# Patient Record
Sex: FEMALE | Race: BLACK OR AFRICAN AMERICAN | Employment: FULL TIME
[De-identification: names, ages, dates, MRNs, and addresses within clinical notes are randomized per-mention and may not be internally consistent; named-entity substitution may affect disease eponyms.]

---

## 2023-01-13 ENCOUNTER — OFFICE VISIT (OUTPATIENT)
Dept: INTERNAL MEDICINE CLINIC | Facility: CLINIC | Age: 53
End: 2023-01-13
Payer: COMMERCIAL

## 2023-01-13 VITALS
SYSTOLIC BLOOD PRESSURE: 180 MMHG | DIASTOLIC BLOOD PRESSURE: 102 MMHG | RESPIRATION RATE: 16 BRPM | OXYGEN SATURATION: 99 % | HEIGHT: 67 IN | WEIGHT: 220 LBS | TEMPERATURE: 97.3 F | HEART RATE: 82 BPM | BODY MASS INDEX: 34.53 KG/M2

## 2023-01-13 DIAGNOSIS — G44.52 NEW DAILY PERSISTENT HEADACHE: ICD-10-CM

## 2023-01-13 DIAGNOSIS — Z12.31 ENCOUNTER FOR SCREENING MAMMOGRAM FOR BREAST CANCER: ICD-10-CM

## 2023-01-13 DIAGNOSIS — I51.7 LVH (LEFT VENTRICULAR HYPERTROPHY): ICD-10-CM

## 2023-01-13 DIAGNOSIS — I10 PRIMARY HYPERTENSION: Primary | ICD-10-CM

## 2023-01-13 DIAGNOSIS — N95.1 HOT FLASHES DUE TO MENOPAUSE: ICD-10-CM

## 2023-01-13 LAB
ALBUMIN SERPL-MCNC: 4 G/DL (ref 3.5–5)
ALBUMIN/GLOB SERPL: 1.2 (ref 0.4–1.6)
ALP SERPL-CCNC: 69 U/L (ref 50–136)
ALT SERPL-CCNC: 20 U/L (ref 12–65)
ANION GAP SERPL CALC-SCNC: 6 MMOL/L (ref 2–11)
APPEARANCE UR: ABNORMAL
AST SERPL-CCNC: 15 U/L (ref 15–37)
BACTERIA URNS QL MICRO: ABNORMAL /HPF
BASOPHILS # BLD: 0 K/UL (ref 0–0.2)
BASOPHILS NFR BLD: 1 % (ref 0–2)
BILIRUB SERPL-MCNC: 0.3 MG/DL (ref 0.2–1.1)
BILIRUB UR QL: NEGATIVE
BUN SERPL-MCNC: 15 MG/DL (ref 6–23)
CALCIUM SERPL-MCNC: 10.6 MG/DL (ref 8.3–10.4)
CASTS URNS QL MICRO: 0 /LPF
CHLORIDE SERPL-SCNC: 107 MMOL/L (ref 101–110)
CO2 SERPL-SCNC: 28 MMOL/L (ref 21–32)
COLOR UR: ABNORMAL
CREAT SERPL-MCNC: 1.2 MG/DL (ref 0.6–1)
CRYSTALS URNS QL MICRO: 0 /LPF
DIFFERENTIAL METHOD BLD: ABNORMAL
EOSINOPHIL # BLD: 0.1 K/UL (ref 0–0.8)
EOSINOPHIL NFR BLD: 2 % (ref 0.5–7.8)
EPI CELLS #/AREA URNS HPF: ABNORMAL /HPF
ERYTHROCYTE [DISTWIDTH] IN BLOOD BY AUTOMATED COUNT: 14.4 % (ref 11.9–14.6)
ESTRADIOL SERPL-MCNC: <11.8 PG/ML
FSH SERPL-ACNC: 136.9 MIU/ML
GLOBULIN SER CALC-MCNC: 3.4 G/DL (ref 2.8–4.5)
GLUCOSE SERPL-MCNC: 102 MG/DL (ref 65–100)
GLUCOSE UR STRIP.AUTO-MCNC: NEGATIVE MG/DL
HCT VFR BLD AUTO: 36.6 % (ref 35.8–46.3)
HGB BLD-MCNC: 11.5 G/DL (ref 11.7–15.4)
HGB UR QL STRIP: NEGATIVE
IMM GRANULOCYTES # BLD AUTO: 0 K/UL (ref 0–0.5)
IMM GRANULOCYTES NFR BLD AUTO: 1 % (ref 0–5)
KETONES UR QL STRIP.AUTO: NEGATIVE MG/DL
LEUKOCYTE ESTERASE UR QL STRIP.AUTO: ABNORMAL
LYMPHOCYTES # BLD: 1.3 K/UL (ref 0.5–4.6)
LYMPHOCYTES NFR BLD: 22 % (ref 13–44)
MAGNESIUM SERPL-MCNC: 2.1 MG/DL (ref 1.8–2.4)
MCH RBC QN AUTO: 28.4 PG (ref 26.1–32.9)
MCHC RBC AUTO-ENTMCNC: 31.4 G/DL (ref 31.4–35)
MCV RBC AUTO: 90.4 FL (ref 82–102)
MONOCYTES # BLD: 0.4 K/UL (ref 0.1–1.3)
MONOCYTES NFR BLD: 7 % (ref 4–12)
MUCOUS THREADS URNS QL MICRO: ABNORMAL /LPF
NEUTS SEG # BLD: 3.9 K/UL (ref 1.7–8.2)
NEUTS SEG NFR BLD: 67 % (ref 43–78)
NITRITE UR QL STRIP.AUTO: NEGATIVE
NRBC # BLD: 0 K/UL (ref 0–0.2)
OTHER OBSERVATIONS: ABNORMAL
PH UR STRIP: 5.5 (ref 5–9)
PLATELET # BLD AUTO: 286 K/UL (ref 150–450)
PMV BLD AUTO: 10.7 FL (ref 9.4–12.3)
POTASSIUM SERPL-SCNC: 4.1 MMOL/L (ref 3.5–5.1)
PROT SERPL-MCNC: 7.4 G/DL (ref 6.3–8.2)
PROT UR STRIP-MCNC: ABNORMAL MG/DL
RBC # BLD AUTO: 4.05 M/UL (ref 4.05–5.2)
RBC #/AREA URNS HPF: ABNORMAL /HPF
SODIUM SERPL-SCNC: 141 MMOL/L (ref 133–143)
SP GR UR REFRACTOMETRY: 1.02 (ref 1–1.02)
TSH W FREE THYROID IF ABNORMAL: 0.5 UIU/ML (ref 0.36–3.74)
UROBILINOGEN UR QL STRIP.AUTO: 0.2 EU/DL (ref 0.2–1)
WBC # BLD AUTO: 5.8 K/UL (ref 4.3–11.1)
WBC URNS QL MICRO: ABNORMAL /HPF

## 2023-01-13 PROCEDURE — 93000 ELECTROCARDIOGRAM COMPLETE: CPT | Performed by: INTERNAL MEDICINE

## 2023-01-13 PROCEDURE — 3078F DIAST BP <80 MM HG: CPT | Performed by: INTERNAL MEDICINE

## 2023-01-13 PROCEDURE — 3074F SYST BP LT 130 MM HG: CPT | Performed by: INTERNAL MEDICINE

## 2023-01-13 PROCEDURE — 99214 OFFICE O/P EST MOD 30 MIN: CPT | Performed by: INTERNAL MEDICINE

## 2023-01-13 RX ORDER — METOPROLOL TARTRATE 50 MG/1
50 TABLET, FILM COATED ORAL 2 TIMES DAILY
Qty: 60 TABLET | Refills: 3 | Status: SHIPPED | OUTPATIENT
Start: 2023-01-13 | End: 2023-01-20 | Stop reason: DRUGHIGH

## 2023-01-13 RX ORDER — OLMESARTAN MEDOXOMIL AND HYDROCHLOROTHIAZIDE 40/25 40; 25 MG/1; MG/1
1 TABLET ORAL DAILY
COMMUNITY
Start: 2021-12-17 | End: 2023-01-13 | Stop reason: SINTOL

## 2023-01-13 ASSESSMENT — PATIENT HEALTH QUESTIONNAIRE - PHQ9
SUM OF ALL RESPONSES TO PHQ QUESTIONS 1-9: 0
SUM OF ALL RESPONSES TO PHQ QUESTIONS 1-9: 0
1. LITTLE INTEREST OR PLEASURE IN DOING THINGS: 0
SUM OF ALL RESPONSES TO PHQ9 QUESTIONS 1 & 2: 0
2. FEELING DOWN, DEPRESSED OR HOPELESS: 0
SUM OF ALL RESPONSES TO PHQ QUESTIONS 1-9: 0
SUM OF ALL RESPONSES TO PHQ QUESTIONS 1-9: 0

## 2023-01-13 NOTE — PROGRESS NOTES
vis01/13/2023   Location:[facility] [address] [facility]  [place]  Patient #:  [id number]  YOB: 1970        History of Present Illness     Chief Complaint   Patient presents with    New Patient     Establishing care    Hypertension    Menopause       Ms. Mica Castano is a 47 y.o. female  who presents for visit to establish care. Chronic active medical issues assessed today include  HTN, obesity HLD  Amlodipine nausea took 2-3 weeks. HCTZ - nausea  nausea. Benicar hctz 40-25. Untreated HTN. Reports episode of HA, lethargy and confusion. She sleep all that day. The next felt better. Last Labs      No Known Allergies  Past Medical History:   Diagnosis Date    Hypertension      Social History     Socioeconomic History    Marital status:    Tobacco Use    Smoking status: Never    Smokeless tobacco: Never   Vaping Use    Vaping Use: Never used   Substance and Sexual Activity    Drug use: Never     Past Surgical History:   Procedure Laterality Date    BREAST BIOPSY Right     HYSTERECTOMY (CERVIX STATUS UNKNOWN)      MOUTH SURGERY      WISDOM TOOTH EXTRACTION       Family History   Problem Relation Age of Onset    Heart Attack Father      Current Outpatient Medications   Medication Sig Dispense Refill    olmesartan-hydroCHLOROthiazide (BENICAR HCT) 40-25 MG per tablet Take 1 tablet by mouth daily (Patient not taking: Reported on 1/13/2023)       No current facility-administered medications for this visit.      Health Maintenance   Topic Date Due    COVID-19 Vaccine (1) Never done    Depression Screen  Never done    HIV screen  Never done    Hepatitis C screen  Never done    DTaP/Tdap/Td vaccine (3 - Tdap) 08/08/1988    Diabetes screen  Never done    Lipids  Never done    Colorectal Cancer Screen  Never done    Breast cancer screen  Never done    Shingles vaccine (1 of 2) Never done    Flu vaccine (1) Never done    Hepatitis A vaccine  Aged Out    Hib vaccine  Aged Out Meningococcal (ACWY) vaccine  Aged Out    Pneumococcal 0-64 years Vaccine  Aged Out             Review of Systems  Review of Systems   Constitutional:  Positive for diaphoresis (hot flashes) and fatigue. Negative for fever. Eyes:  Negative for visual disturbance. Respiratory:  Negative for cough and shortness of breath. Cardiovascular:  Positive for palpitations and leg swelling. Gastrointestinal:  Negative for abdominal pain and nausea. Genitourinary:  Negative for difficulty urinating. Musculoskeletal:  Positive for arthralgias and myalgias. Neurological:  Positive for headaches. Psychiatric/Behavioral:  Positive for dysphoric mood and sleep disturbance. BP (!) 187/110 (Site: Left Upper Arm, Position: Sitting)   Pulse 82   Temp 97.3 °F (36.3 °C) (Temporal)   Resp 16   Ht 5' 7\" (1.702 m)   Wt 220 lb (99.8 kg)   SpO2 99%   BMI 34.46 kg/m²     Wt Readings from Last 3 Encounters:   01/13/23 220 lb (99.8 kg)       Physical Exam    Physical Exam  Vitals and nursing note reviewed. Constitutional:       Appearance: Normal appearance. HENT:      Head: Normocephalic. Right Ear: Tympanic membrane normal.      Left Ear: Tympanic membrane normal.      Nose: Nose normal.      Mouth/Throat:      Mouth: Mucous membranes are moist.   Eyes:      Extraocular Movements: Extraocular movements intact. Conjunctiva/sclera: Conjunctivae normal.      Pupils: Pupils are equal, round, and reactive to light. Neck:      Vascular: No carotid bruit. Cardiovascular:      Rate and Rhythm: Normal rate and regular rhythm. Pulmonary:      Effort: Pulmonary effort is normal.      Breath sounds: Normal breath sounds. Chest:   Breasts:     Right: Normal. No mass. Left: Normal. No mass. Abdominal:      General: Bowel sounds are normal.      Palpations: Abdomen is soft. Tenderness: There is no abdominal tenderness. Lymphadenopathy:      Cervical: No cervical adenopathy.       Upper Body: Right upper body: No supraclavicular or axillary adenopathy. Left upper body: No supraclavicular or axillary adenopathy. Skin:     General: Skin is warm and dry. Neurological:      Mental Status: She is alert. Cranial Nerves: Cranial nerves 2-12 are intact. Motor: Motor function is intact. Gait: Gait is intact. Psychiatric:         Attention and Perception: Attention normal.         Mood and Affect: Mood normal.         Speech: Speech normal.         Cognition and Memory: Cognition normal.     EKG   Sinus  Rhythm @74bpm  -RSR(V1) -nondiagnostic. Voltage criteria for LVH  (R(I)+S(III) exceeds 2.50 mV)    No ischemic ST TW abn    Assessment & Plan    Encounter Diagnoses   Name Primary?     Primary hypertension Yes    Hot flashes due to menopause     New daily persistent headache     LVH (left ventricular hypertrophy)     Encounter for screening mammogram for breast cancer        Orders Placed This Encounter   Medications    metoprolol tartrate (LOPRESSOR) 50 MG tablet     Sig: Take 1 tablet by mouth 2 times daily     Dispense:  60 tablet     Refill:  3       Orders Placed This Encounter   Procedures    JOSE JAYNE DIGITAL SCREEN BILATERAL     Standing Status:   Future     Standing Expiration Date:   3/13/2024     Scheduling Instructions:      BE[facility]    CT HEAD WO CONTRAST     Standing Status:   Future     Standing Expiration Date:   1/13/2024     Scheduling Instructions:      [facility]     Order Specific Question:   Reason for exam:     Answer:   hypertension poorly controlled, headaches and an episode of confusion    CBC with Auto Differential     Standing Status:   Future     Number of Occurrences:   1     Standing Expiration Date:   1/13/2024    Comprehensive Metabolic Panel     Standing Status:   Future     Number of Occurrences:   1     Standing Expiration Date:   1/13/2024    Magnesium     Standing Status:   Future     Number of Occurrences:   1     Standing Expiration Date:   1/13/2024    TSH with Reflex     Standing Status:   Future     Number of Occurrences:   1     Standing Expiration Date:   1/13/2024    Urinalysis     Standing Status:   Future     Number of Occurrences:   1     Standing Expiration Date:   9/08/3755    Follicle Stimulating Hormone     Standing Status:   Future     Number of Occurrences:   1     Standing Expiration Date:   1/13/2024    Estradiol     Standing Status:   Future     Number of Occurrences:   1     Standing Expiration Date:   1/13/2024    Urinalysis, Micro     ATLASORDERNUMBER:[id number]:UMIC*URINALYSIS, MICRO    [address] [facility] [place]     Referral Priority:   Routine     Referral Type:   Eval and Treat     Referral Reason:   Specialty Services Required     Requested Specialty:   Cardiology     Number of Visits Requested:   1    EKG 12 Lead     Standing Status:   Future     Number of Occurrences:   1     Standing Expiration Date:   1/13/2024     Order Specific Question:   Reason for Exam?     Answer:   Hypertension     HTN review of previous notes reveal this has been her BP for sometime mostly due to compliance and med intolerance issues. Start Metoprolol. LVH from poorly controlled HTN. Check echo. Discussed complications of HTN and encouraged compliance with treatment. If she has issues she should contact the office. She will monitor her BP at home and return in a week. Check head CT check for past stroke. The patient is asked to make an attempt to improve diet and exercise patterns to aid in medical management of this problem. Will address other issues during next couple of follow up appt. Discussed the importance of regular breast exams and mammograms. BSE reviewed with patient. No follow-ups on file.         Virgen Sandoval MD

## 2023-01-16 ENCOUNTER — TELEPHONE (OUTPATIENT)
Dept: INTERNAL MEDICINE CLINIC | Facility: CLINIC | Age: 53
End: 2023-01-16

## 2023-01-16 NOTE — TELEPHONE ENCOUNTER
----- Message from Papito Paredes MD sent at 1/13/2023  6:49 PM EST -----  Reviewed recent labs    Recent labs were reviewed. Glucose/Blood sugar was 102. This okay since you were not completely fasting. Your liver function and thyroid function  were normal.  Your chronic kidney disease is stable to a little improved compared on 2021 labs  You are definitely menopausal.  Calcium was a little elevated so stop any calcium supplements you may be taking. Are you having any urinary symptoms? You had a few wbc and rbc in your urine.

## 2023-01-20 ENCOUNTER — OFFICE VISIT (OUTPATIENT)
Dept: INTERNAL MEDICINE CLINIC | Facility: CLINIC | Age: 53
End: 2023-01-20
Payer: COMMERCIAL

## 2023-01-20 VITALS
HEART RATE: 63 BPM | SYSTOLIC BLOOD PRESSURE: 168 MMHG | WEIGHT: 217.4 LBS | TEMPERATURE: 97.4 F | RESPIRATION RATE: 17 BRPM | OXYGEN SATURATION: 98 % | HEIGHT: 67 IN | BODY MASS INDEX: 34.12 KG/M2 | DIASTOLIC BLOOD PRESSURE: 99 MMHG

## 2023-01-20 DIAGNOSIS — N95.1 HOT FLASHES DUE TO MENOPAUSE: ICD-10-CM

## 2023-01-20 DIAGNOSIS — I51.7 LVH (LEFT VENTRICULAR HYPERTROPHY): ICD-10-CM

## 2023-01-20 DIAGNOSIS — I10 PRIMARY HYPERTENSION: Primary | ICD-10-CM

## 2023-01-20 PROCEDURE — 3074F SYST BP LT 130 MM HG: CPT | Performed by: INTERNAL MEDICINE

## 2023-01-20 PROCEDURE — 99214 OFFICE O/P EST MOD 30 MIN: CPT | Performed by: INTERNAL MEDICINE

## 2023-01-20 PROCEDURE — 3078F DIAST BP <80 MM HG: CPT | Performed by: INTERNAL MEDICINE

## 2023-01-20 RX ORDER — METOPROLOL TARTRATE 50 MG/1
75 TABLET, FILM COATED ORAL 2 TIMES DAILY
Qty: 270 TABLET | Refills: 3 | Status: SHIPPED | OUTPATIENT
Start: 2023-01-20 | End: 2024-01-20

## 2023-01-20 RX ORDER — PAROXETINE 10 MG/1
TABLET, FILM COATED ORAL
Qty: 30 TABLET | Refills: 3 | Status: SHIPPED | OUTPATIENT
Start: 2023-01-20 | End: 2023-02-25

## 2023-01-20 SDOH — ECONOMIC STABILITY: FOOD INSECURITY: WITHIN THE PAST 12 MONTHS, YOU WORRIED THAT YOUR FOOD WOULD RUN OUT BEFORE YOU GOT MONEY TO BUY MORE.: NEVER TRUE

## 2023-01-20 SDOH — ECONOMIC STABILITY: FOOD INSECURITY: WITHIN THE PAST 12 MONTHS, THE FOOD YOU BOUGHT JUST DIDN'T LAST AND YOU DIDN'T HAVE MONEY TO GET MORE.: NEVER TRUE

## 2023-01-20 ASSESSMENT — PATIENT HEALTH QUESTIONNAIRE - PHQ9
SUM OF ALL RESPONSES TO PHQ QUESTIONS 1-9: 0
1. LITTLE INTEREST OR PLEASURE IN DOING THINGS: 0
SUM OF ALL RESPONSES TO PHQ QUESTIONS 1-9: 0
SUM OF ALL RESPONSES TO PHQ QUESTIONS 1-9: 0
2. FEELING DOWN, DEPRESSED OR HOPELESS: 0
SUM OF ALL RESPONSES TO PHQ9 QUESTIONS 1 & 2: 0
SUM OF ALL RESPONSES TO PHQ QUESTIONS 1-9: 0

## 2023-01-20 ASSESSMENT — ENCOUNTER SYMPTOMS
ABDOMINAL PAIN: 0
SHORTNESS OF BREATH: 0
NAUSEA: 0
COUGH: 0

## 2023-01-20 ASSESSMENT — SOCIAL DETERMINANTS OF HEALTH (SDOH): HOW HARD IS IT FOR YOU TO PAY FOR THE VERY BASICS LIKE FOOD, HOUSING, MEDICAL CARE, AND HEATING?: NOT HARD AT ALL

## 2023-01-20 NOTE — PROGRESS NOTES
01/20/2023   Location:[facility] [address] [facility]  [place]  Patient #:  [id number]  YOB: 1970        History of Present Illness     Chief Complaint   Patient presents with    Follow-up     One week follow up:  HTN:  blood pressure still elevated       Ms. Hollie Collier is a 47 y.o. female  who presents for BP follow up. Started on Metoprolol at last visit. Readings better but not to gaol. Tolerating medication. Reviewed recommended dietary changes and recommended exercise to help manage BP. Discussed treatment options for hot flashes. ERT vs SSRI. She opts to try Paxil over ERT. Reviewed recent labs. She is scheduled for cardiac echo later this month.   Last Labs  CBC:   Lab Results   Component Value Date/Time    WBC 5.8 01/13/2023 09:58 AM    RBC 4.05 01/13/2023 09:58 AM    HGB 11.5 01/13/2023 09:58 AM    HCT 36.6 01/13/2023 09:58 AM    MCV 90.4 01/13/2023 09:58 AM    MCH 28.4 01/13/2023 09:58 AM    MCHC 31.4 01/13/2023 09:58 AM    RDW 14.4 01/13/2023 09:58 AM     01/13/2023 09:58 AM    MPV 10.7 01/13/2023 09:58 AM     CMP:    Lab Results   Component Value Date/Time     01/13/2023 09:58 AM    K 4.1 01/13/2023 09:58 AM     01/13/2023 09:58 AM    CO2 28 01/13/2023 09:58 AM    BUN 15 01/13/2023 09:58 AM    CREATININE 1.20 01/13/2023 09:58 AM    LABGLOM 54 01/13/2023 09:58 AM    GLUCOSE 102 01/13/2023 09:58 AM    PROT 7.4 01/13/2023 09:58 AM    LABALBU 4.0 01/13/2023 09:58 AM    CALCIUM 10.6 01/13/2023 09:58 AM    BILITOT 0.3 01/13/2023 09:58 AM    ALKPHOS 69 01/13/2023 09:58 AM    AST 15 01/13/2023 09:58 AM    ALT 20 01/13/2023 09:58 AM     HgBA1c:  No results found for: LABA1C  Microalbumen/Creatinine ratio:  No components found for: RUCREAT  FLP:  No results found for: TRIG, HDL, LDLCALC, LDLDIRECT, LABVLDL  TSH:  No results found for: TSH    No Known Allergies  Past Medical History:   Diagnosis Date    Hypertension      Social History     Socioeconomic History    Marital status:      Spouse name: None    Number of children: None    Years of education: None    Highest education level: None   Tobacco Use    Smoking status: Never    Smokeless tobacco: Never   Vaping Use    Vaping Use: Never used   Substance and Sexual Activity    Drug use: Never     Social Determinants of Health     Financial Resource Strain: Low Risk     Difficulty of Paying Living Expenses: Not hard at all   Food Insecurity: No Food Insecurity    Worried About Running Out of Food in the Last Year: Never true    Ran Out of Food in the Last Year: Never true     Past Surgical History:   Procedure Laterality Date    BREAST BIOPSY Right     HYSTERECTOMY, TOTAL ABDOMINAL (CERVIX REMOVED)  2000    DUB    MOUTH SURGERY      WISDOM TOOTH EXTRACTION       Family History   Problem Relation Age of Onset    Heart Attack Father     Breast Cancer Maternal Grandmother      Current Outpatient Medications   Medication Sig Dispense Refill    metoprolol tartrate (LOPRESSOR) 50 MG tablet Take 1.5 tablets by mouth 2 times daily 270 tablet 3    PARoxetine (PAXIL) 10 MG tablet Take 0.5 tablets by mouth every evening for 6 days, THEN 1 tablet every evening. 30 tablet 3     No current facility-administered medications for this visit.      Health Maintenance   Topic Date Due    COVID-19 Vaccine (1) Never done    HIV screen  Never done    Hepatitis C screen  Never done    DTaP/Tdap/Td vaccine (3 - Tdap) 08/08/1988    Diabetes screen  Never done    Lipids  Never done    Colorectal Cancer Screen  Never done    Breast cancer screen  Never done    Shingles vaccine (1 of 2) Never done    Flu vaccine (1) Never done    Depression Screen  01/13/2024    GFR test (Diabetes, CKD 3-4, OR last GFR 15-59)  01/13/2024    Hepatitis A vaccine  Aged Out    Hib vaccine  Aged Out    Meningococcal (ACWY) vaccine  Aged Out    Pneumococcal 0-64 years Vaccine  Aged Out             Review of Systems  Review of Systems    BP (!) 168/99 (Site: Left Upper Arm, Position: Sitting, Cuff Size: Large Adult)   Pulse 63   Temp 97.4 °F (36.3 °C) (Skin)   Resp 17   Ht 5' 7\" (1.702 m)   Wt 217 lb 6.4 oz (98.6 kg)   SpO2 98%   BMI 34.05 kg/m²     Wt Readings from Last 3 Encounters:   01/20/23 217 lb 6.4 oz (98.6 kg)   01/13/23 220 lb (99.8 kg)       Physical Exam    Physical Exam  Vitals and nursing note reviewed. Constitutional:       Appearance: Normal appearance. Cardiovascular:      Rate and Rhythm: Normal rate and regular rhythm. Pulmonary:      Effort: Pulmonary effort is normal.      Breath sounds: Normal breath sounds. Neurological:      Mental Status: She is alert. Assessment & Plan    Encounter Diagnoses   Name Primary? Primary hypertension Yes    Hot flashes due to menopause     LVH (left ventricular hypertrophy)        Orders Placed This Encounter   Medications    metoprolol tartrate (LOPRESSOR) 50 MG tablet     Sig: Take 1.5 tablets by mouth 2 times daily     Dispense:  270 tablet     Refill:  3    DISCONTD: estrogens, conjugated, (PREMARIN) 0.3 MG tablet     Sig: Take 1 tablet by mouth daily     Dispense:  30 tablet     Refill:  5    PARoxetine (PAXIL) 10 MG tablet     Sig: Take 0.5 tablets by mouth every evening for 6 days, THEN 1 tablet every evening. Dispense:  30 tablet     Refill:  3       Increase Metoprolol for better BP control     The patient is asked to make an attempt to improve diet and exercise patterns to aid in medical management of this problem. Reviewed SE and benefits of Paxil. Start with 5mg qhs and increase to 10mg. Return in about 4 weeks (around 2/17/2023) for re evaluation of hypertension, re evaluation of medication change.         Basilio Gomez MD

## 2023-02-02 DIAGNOSIS — G44.52 NEW DAILY PERSISTENT HEADACHE: ICD-10-CM

## 2023-02-02 DIAGNOSIS — I10 PRIMARY HYPERTENSION: ICD-10-CM

## 2023-02-10 ENCOUNTER — TELEPHONE (OUTPATIENT)
Dept: INTERNAL MEDICINE CLINIC | Facility: CLINIC | Age: 53
End: 2023-02-10

## 2023-02-10 NOTE — TELEPHONE ENCOUNTER
----- Message from Sravani Melgar MD sent at 2/10/2023  2:36 PM EST -----  Normal heart function. She does have some increase in wall thickness as we discuss from chronic uncontrolled HTN. She has  Mitral prolapse with mild leaky valve. Will just repeat study in 1-2 years. Will discuss further at your follow up appt. Hope you have been monitoring your BP, exercising and limiting your salt intake.    Sravani Melgar MD

## 2023-02-27 ENCOUNTER — OFFICE VISIT (OUTPATIENT)
Dept: INTERNAL MEDICINE CLINIC | Facility: CLINIC | Age: 53
End: 2023-02-27
Payer: COMMERCIAL

## 2023-02-27 VITALS
HEIGHT: 67 IN | RESPIRATION RATE: 16 BRPM | WEIGHT: 214 LBS | TEMPERATURE: 97 F | DIASTOLIC BLOOD PRESSURE: 100 MMHG | HEART RATE: 72 BPM | OXYGEN SATURATION: 98 % | SYSTOLIC BLOOD PRESSURE: 142 MMHG | BODY MASS INDEX: 33.59 KG/M2

## 2023-02-27 DIAGNOSIS — I34.1 MVP (MITRAL VALVE PROLAPSE): ICD-10-CM

## 2023-02-27 DIAGNOSIS — I34.0 NONRHEUMATIC MITRAL VALVE REGURGITATION: ICD-10-CM

## 2023-02-27 DIAGNOSIS — I10 PRIMARY HYPERTENSION: Primary | ICD-10-CM

## 2023-02-27 DIAGNOSIS — N95.1 HOT FLASHES DUE TO MENOPAUSE: ICD-10-CM

## 2023-02-27 PROCEDURE — 99214 OFFICE O/P EST MOD 30 MIN: CPT | Performed by: INTERNAL MEDICINE

## 2023-02-27 PROCEDURE — 3078F DIAST BP <80 MM HG: CPT | Performed by: INTERNAL MEDICINE

## 2023-02-27 PROCEDURE — 3074F SYST BP LT 130 MM HG: CPT | Performed by: INTERNAL MEDICINE

## 2023-02-27 RX ORDER — PAROXETINE HYDROCHLORIDE 20 MG/1
20 TABLET, FILM COATED ORAL EVERY EVENING
Qty: 90 TABLET | Refills: 3 | Status: SHIPPED | OUTPATIENT
Start: 2023-02-27 | End: 2024-02-27

## 2023-02-27 RX ORDER — VALSARTAN 80 MG/1
80 TABLET ORAL DAILY
Qty: 90 TABLET | Refills: 1 | Status: SHIPPED | OUTPATIENT
Start: 2023-02-27

## 2023-02-27 SDOH — ECONOMIC STABILITY: FOOD INSECURITY: WITHIN THE PAST 12 MONTHS, YOU WORRIED THAT YOUR FOOD WOULD RUN OUT BEFORE YOU GOT MONEY TO BUY MORE.: PATIENT DECLINED

## 2023-02-27 SDOH — ECONOMIC STABILITY: INCOME INSECURITY: HOW HARD IS IT FOR YOU TO PAY FOR THE VERY BASICS LIKE FOOD, HOUSING, MEDICAL CARE, AND HEATING?: SOMEWHAT HARD

## 2023-02-27 SDOH — ECONOMIC STABILITY: HOUSING INSECURITY
IN THE LAST 12 MONTHS, WAS THERE A TIME WHEN YOU DID NOT HAVE A STEADY PLACE TO SLEEP OR SLEPT IN A SHELTER (INCLUDING NOW)?: NO

## 2023-02-27 SDOH — ECONOMIC STABILITY: FOOD INSECURITY: WITHIN THE PAST 12 MONTHS, THE FOOD YOU BOUGHT JUST DIDN'T LAST AND YOU DIDN'T HAVE MONEY TO GET MORE.: PATIENT DECLINED

## 2023-02-27 ASSESSMENT — PATIENT HEALTH QUESTIONNAIRE - PHQ9
SUM OF ALL RESPONSES TO PHQ QUESTIONS 1-9: 0
1. LITTLE INTEREST OR PLEASURE IN DOING THINGS: 0
SUM OF ALL RESPONSES TO PHQ QUESTIONS 1-9: 0
SUM OF ALL RESPONSES TO PHQ9 QUESTIONS 1 & 2: 0
2. FEELING DOWN, DEPRESSED OR HOPELESS: 0

## 2023-02-27 NOTE — PATIENT INSTRUCTIONS
Start Valsartan 80mg daily for BP. Continue taking the metoprolol  1 and 1/2 tablet. Wait 2 weeks then increase dose of Paroxetine 20mg daily for hot flashes. .     Check you blood pressure and your heart  rate and write them down and bring the readings with you to your appointment to see me in a month. If you are having any problems with medications please call the office so we can discuss before you decide to abandon the plan. We want to prevent complications of poorly controlled hypertension. I also do not want to make your feel miserable. So let's discuss any concerns you may have.

## 2023-02-27 NOTE — PROGRESS NOTES
02/27/2023   Location:[facility] [address] [facility]  [place]  Patient #:  [id number]  YOB: 1970        History of Present Illness     Chief Complaint   Patient presents with    Follow-up Chronic Condition     1 month f/u    Menopause       Ms. Karena Acevedo is a 47 y.o. female  who presents for Follow up on chronic medical issues. There is compliance and tolerance with medications. Chronic active medical issues assessed today include  HTN, menopausal symptoms. 1405/88-160s  Blood pressure remains a little elevated at home. She is tolerating medication so far. Her biggest complaint is still the hot flashes. The Paxil 10 mg may have helped a little she is not sure. .  We did discuss considering going up on the dose so we will try going up to Paxil 20 mg to see if this will help better with the hot flash issues. She wants to avoid hormone replacement due to family history of cancers and breast cancer. Recent labs and also her recent echo report. There was mild LVH. Otherwise she had good diastolic and systolic heart function. Have mild mitral valve prolapse with mild regurg.   Last Labs  CBC:   Lab Results   Component Value Date/Time    WBC 5.8 01/13/2023 09:58 AM    RBC 4.05 01/13/2023 09:58 AM    HGB 11.5 01/13/2023 09:58 AM    HCT 36.6 01/13/2023 09:58 AM    MCV 90.4 01/13/2023 09:58 AM    MCH 28.4 01/13/2023 09:58 AM    MCHC 31.4 01/13/2023 09:58 AM    RDW 14.4 01/13/2023 09:58 AM     01/13/2023 09:58 AM    MPV 10.7 01/13/2023 09:58 AM     CMP:    Lab Results   Component Value Date/Time     01/13/2023 09:58 AM    K 4.1 01/13/2023 09:58 AM     01/13/2023 09:58 AM    CO2 28 01/13/2023 09:58 AM    BUN 15 01/13/2023 09:58 AM    CREATININE 1.20 01/13/2023 09:58 AM    LABGLOM 54 01/13/2023 09:58 AM    GLUCOSE 102 01/13/2023 09:58 AM    PROT 7.4 01/13/2023 09:58 AM    LABALBU 4.0 01/13/2023 09:58 AM    CALCIUM 10.6 01/13/2023 09:58 AM    BILITOT 0.3 01/13/2023 09:58 AM    ALKPHOS 69 01/13/2023 09:58 AM    AST 15 01/13/2023 09:58 AM    ALT 20 01/13/2023 09:58 AM     FLP:  No results found for: TRIG, HDL, LDLCALC, LDLDIRECT, LABVLDL  TSH:  No results found for: TSH    No Known Allergies  Past Medical History:   Diagnosis Date    Hypertension      Social History     Socioeconomic History    Marital status:      Spouse name: None    Number of children: None    Years of education: None    Highest education level: None   Tobacco Use    Smoking status: Never    Smokeless tobacco: Never   Vaping Use    Vaping Use: Never used   Substance and Sexual Activity    Drug use: Never     Social Determinants of Health     Financial Resource Strain: Medium Risk    Difficulty of Paying Living Expenses: Somewhat hard   Food Insecurity: Unknown    Worried About Running Out of Food in the Last Year: Patient refused    Ran Out of Food in the Last Year: Patient refused   Transportation Needs: Unknown    Lack of Transportation (Non-Medical): No   Housing Stability: Unknown    Unstable Housing in the Last Year: No     Past Surgical History:   Procedure Laterality Date    BREAST BIOPSY Right     HYSTERECTOMY, TOTAL ABDOMINAL (CERVIX REMOVED)  2000    DUB    MOUTH SURGERY      WISDOM TOOTH EXTRACTION       Family History   Problem Relation Age of Onset    Heart Attack Father     Breast Cancer Maternal Grandmother      Current Outpatient Medications   Medication Sig Dispense Refill    metoprolol tartrate (LOPRESSOR) 50 MG tablet Take 1.5 tablets by mouth 2 times daily 270 tablet 3    PARoxetine (PAXIL) 10 MG tablet Take 0.5 tablets by mouth every evening for 6 days, THEN 1 tablet every evening. 30 tablet 3     No current facility-administered medications for this visit.      Health Maintenance   Topic Date Due    COVID-19 Vaccine (1) Never done    HIV screen  Never done    Hepatitis C screen  Never done    DTaP/Tdap/Td vaccine (3 - Tdap) 08/08/1988    Diabetes screen  Never done Lipids  Never done    Colorectal Cancer Screen  Never done    Breast cancer screen  Never done    Shingles vaccine (1 of 2) Never done    Flu vaccine (1) Never done    GFR test (Diabetes, CKD 3-4, OR last GFR 15-59)  01/13/2024    Depression Screen  01/20/2024    Hepatitis A vaccine  Aged Out    Hib vaccine  Aged Out    Meningococcal (ACWY) vaccine  Aged Out    Pneumococcal 0-64 years Vaccine  Aged Out             Review of Systems  Review of Systems   Constitutional:  Diaphoresis: hot flashes. Respiratory:  Negative for shortness of breath. Cardiovascular:  Negative for chest pain and palpitations. BP (!) 169/104 (Site: Left Upper Arm, Position: Sitting)   Pulse 72   Temp 97 °F (36.1 °C) (Temporal)   Resp 16   Ht 5' 7\" (1.702 m)   Wt 214 lb (97.1 kg)   SpO2 98%   BMI 33.52 kg/m²     Wt Readings from Last 3 Encounters:   02/27/23 214 lb (97.1 kg)   01/31/23 217 lb (98.4 kg)   01/20/23 217 lb 6.4 oz (98.6 kg)       Physical Exam    Physical Exam  Vitals and nursing note reviewed. Constitutional:       Appearance: Normal appearance. She is obese. HENT:      Head: Normocephalic and atraumatic. Cardiovascular:      Rate and Rhythm: Normal rate and regular rhythm. Pulmonary:      Effort: Pulmonary effort is normal.      Breath sounds: Normal breath sounds. Neurological:      Mental Status: She is alert. Assessment & Plan    Encounter Diagnoses   Name Primary? Primary hypertension Yes    Hot flashes due to menopause     MVP (mitral valve prolapse)     Nonrheumatic mitral valve regurgitation     Comment: mild        Orders Placed This Encounter   Medications    valsartan (DIOVAN) 80 MG tablet     Sig: Take 1 tablet by mouth daily     Dispense:  90 tablet     Refill:  1    PARoxetine (PAXIL) 20 MG tablet     Sig: Take 1 tablet by mouth every evening     Dispense:  90 tablet     Refill:  3           Add Diovan for better BP control. Return in a month for BP recheck.       She is reminded to call if she has any concerns with her medications so that we can discuss possible solutions. Discussed the importance of good blood pressure control to limit risk of complications of heart disease stroke and kidney failure. Increase Paxil to 20mg for hotflashes    Return in about 4 weeks (around 3/27/2023) for re evaluation of medication change, re evaluation of hypertension.         Rita Renteria MD

## 2023-03-06 ASSESSMENT — ENCOUNTER SYMPTOMS: SHORTNESS OF BREATH: 0

## 2023-03-27 ENCOUNTER — OFFICE VISIT (OUTPATIENT)
Dept: INTERNAL MEDICINE CLINIC | Facility: CLINIC | Age: 53
End: 2023-03-27

## 2023-03-27 VITALS
DIASTOLIC BLOOD PRESSURE: 106 MMHG | HEART RATE: 60 BPM | RESPIRATION RATE: 16 BRPM | WEIGHT: 211 LBS | TEMPERATURE: 97.3 F | SYSTOLIC BLOOD PRESSURE: 165 MMHG | OXYGEN SATURATION: 98 % | HEIGHT: 67 IN | BODY MASS INDEX: 33.12 KG/M2

## 2023-03-27 DIAGNOSIS — I10 RESISTANT HYPERTENSION: Primary | ICD-10-CM

## 2023-03-27 DIAGNOSIS — N95.1 HOT FLASHES DUE TO MENOPAUSE: ICD-10-CM

## 2023-03-27 DIAGNOSIS — I51.7 LVH (LEFT VENTRICULAR HYPERTROPHY): ICD-10-CM

## 2023-03-27 DIAGNOSIS — I34.1 MVP (MITRAL VALVE PROLAPSE): ICD-10-CM

## 2023-03-27 PROCEDURE — 3078F DIAST BP <80 MM HG: CPT | Performed by: INTERNAL MEDICINE

## 2023-03-27 PROCEDURE — 3074F SYST BP LT 130 MM HG: CPT | Performed by: INTERNAL MEDICINE

## 2023-03-27 PROCEDURE — 99214 OFFICE O/P EST MOD 30 MIN: CPT | Performed by: INTERNAL MEDICINE

## 2023-03-27 RX ORDER — VALSARTAN 160 MG/1
160 TABLET ORAL DAILY
Qty: 90 TABLET | Refills: 3 | Status: SHIPPED | OUTPATIENT
Start: 2023-03-27

## 2023-03-27 RX ORDER — METOPROLOL TARTRATE 50 MG/1
75 TABLET, FILM COATED ORAL 2 TIMES DAILY
Qty: 270 TABLET | Refills: 3 | Status: SHIPPED | OUTPATIENT
Start: 2023-03-27 | End: 2023-03-28 | Stop reason: SDUPTHER

## 2023-03-27 ASSESSMENT — PATIENT HEALTH QUESTIONNAIRE - PHQ9
SUM OF ALL RESPONSES TO PHQ QUESTIONS 1-9: 0
SUM OF ALL RESPONSES TO PHQ9 QUESTIONS 1 & 2: 0
SUM OF ALL RESPONSES TO PHQ QUESTIONS 1-9: 0
2. FEELING DOWN, DEPRESSED OR HOPELESS: 0
SUM OF ALL RESPONSES TO PHQ QUESTIONS 1-9: 0
1. LITTLE INTEREST OR PLEASURE IN DOING THINGS: 0
SUM OF ALL RESPONSES TO PHQ QUESTIONS 1-9: 0

## 2023-03-28 ENCOUNTER — TELEPHONE (OUTPATIENT)
Dept: INTERNAL MEDICINE CLINIC | Facility: CLINIC | Age: 53
End: 2023-03-28

## 2023-03-28 RX ORDER — METOPROLOL TARTRATE 75 MG/1
75 TABLET, FILM COATED ORAL 2 TIMES DAILY
Qty: 180 TABLET | Refills: 3 | Status: SHIPPED | OUTPATIENT
Start: 2023-03-28 | End: 2024-03-27

## 2023-03-28 NOTE — TELEPHONE ENCOUNTER
Insurance will no longer cover metoprolol 50 mg bid.  Alternative is once a day or 100 mg once a day as well as nebivolol 5 mg, bisoprolol 5 mg, bisoprolol 5 mg, atenolol 50 mg

## 2023-03-29 NOTE — TELEPHONE ENCOUNTER
Tell patient we will switch to 75mg tablets to take 1 twice a day. Because of her insurance.    Niyah Rea MD

## 2023-04-03 ASSESSMENT — ENCOUNTER SYMPTOMS: SHORTNESS OF BREATH: 0

## 2023-04-13 ENCOUNTER — HOSPITAL ENCOUNTER (OUTPATIENT)
Dept: ULTRASOUND IMAGING | Age: 53
Discharge: HOME | End: 2023-04-16
Payer: COMMERCIAL

## 2023-04-13 DIAGNOSIS — I10 RESISTANT HYPERTENSION: ICD-10-CM

## 2023-04-13 PROCEDURE — 93975 VASCULAR STUDY: CPT

## 2023-04-25 ENCOUNTER — TELEPHONE (OUTPATIENT)
Dept: INTERNAL MEDICINE CLINIC | Facility: CLINIC | Age: 53
End: 2023-04-25

## 2023-04-25 NOTE — TELEPHONE ENCOUNTER
Pt notified about result and verbalized understanding. Lab has been notified and will have the correct equipment.

## 2023-04-25 NOTE — TELEPHONE ENCOUNTER
----- Message from Eva Good MD sent at 4/24/2023  6:37 PM EDT -----  No renal artery stenosis noted on ultrasound will discuss further when she comes for her appt. I will have her do the urine 24 hour urine for metanephrines when she comes to see me next Friday. Make sure we have the appropriate jug.   Eva Good MD

## 2023-04-28 ENCOUNTER — TELEPHONE (OUTPATIENT)
Dept: INTERNAL MEDICINE CLINIC | Facility: CLINIC | Age: 53
End: 2023-04-28

## 2023-04-28 ENCOUNTER — PATIENT MESSAGE (OUTPATIENT)
Dept: INTERNAL MEDICINE CLINIC | Facility: CLINIC | Age: 53
End: 2023-04-28

## 2023-04-28 ENCOUNTER — OFFICE VISIT (OUTPATIENT)
Dept: INTERNAL MEDICINE CLINIC | Facility: CLINIC | Age: 53
End: 2023-04-28
Payer: COMMERCIAL

## 2023-04-28 VITALS
WEIGHT: 213 LBS | HEIGHT: 67 IN | TEMPERATURE: 97.3 F | SYSTOLIC BLOOD PRESSURE: 161 MMHG | OXYGEN SATURATION: 99 % | BODY MASS INDEX: 33.43 KG/M2 | HEART RATE: 92 BPM | DIASTOLIC BLOOD PRESSURE: 106 MMHG

## 2023-04-28 DIAGNOSIS — R30.0 DYSURIA: Primary | ICD-10-CM

## 2023-04-28 DIAGNOSIS — I10 RESISTANT HYPERTENSION: ICD-10-CM

## 2023-04-28 LAB
BILIRUBIN, URINE, POC: NEGATIVE
BLOOD URINE, POC: ABNORMAL
GLUCOSE URINE, POC: NEGATIVE
KETONES, URINE, POC: NEGATIVE
LEUKOCYTE ESTERASE, URINE, POC: ABNORMAL
NITRITE, URINE, POC: NEGATIVE
PH, URINE, POC: 6 (ref 4.6–8)
PROTEIN,URINE, POC: ABNORMAL
SPECIFIC GRAVITY, URINE, POC: 1.01 (ref 1–1.03)
URINALYSIS CLARITY, POC: ABNORMAL
URINALYSIS COLOR, POC: YELLOW
UROBILINOGEN, POC: NORMAL

## 2023-04-28 PROCEDURE — 81003 URINALYSIS AUTO W/O SCOPE: CPT | Performed by: INTERNAL MEDICINE

## 2023-04-28 PROCEDURE — 99214 OFFICE O/P EST MOD 30 MIN: CPT | Performed by: INTERNAL MEDICINE

## 2023-04-28 PROCEDURE — 3078F DIAST BP <80 MM HG: CPT | Performed by: INTERNAL MEDICINE

## 2023-04-28 PROCEDURE — 3074F SYST BP LT 130 MM HG: CPT | Performed by: INTERNAL MEDICINE

## 2023-04-28 RX ORDER — NITROFURANTOIN 25; 75 MG/1; MG/1
100 CAPSULE ORAL 2 TIMES DAILY
Qty: 10 CAPSULE | Refills: 0 | Status: SHIPPED | OUTPATIENT
Start: 2023-04-28 | End: 2023-05-03

## 2023-04-28 ASSESSMENT — PATIENT HEALTH QUESTIONNAIRE - PHQ9
SUM OF ALL RESPONSES TO PHQ QUESTIONS 1-9: 0
SUM OF ALL RESPONSES TO PHQ9 QUESTIONS 1 & 2: 0
SUM OF ALL RESPONSES TO PHQ QUESTIONS 1-9: 0
2. FEELING DOWN, DEPRESSED OR HOPELESS: 0
1. LITTLE INTEREST OR PLEASURE IN DOING THINGS: 0

## 2023-05-01 LAB
BACTERIA SPEC CULT: ABNORMAL
BACTERIA SPEC CULT: ABNORMAL
SERVICE CMNT-IMP: ABNORMAL

## 2023-05-02 ENCOUNTER — TELEPHONE (OUTPATIENT)
Dept: INTERNAL MEDICINE CLINIC | Facility: CLINIC | Age: 53
End: 2023-05-02

## 2023-05-02 LAB — ALDOST SERPL-MCNC: 5.3 NG/DL (ref 0–30)

## 2023-05-02 NOTE — TELEPHONE ENCOUNTER
----- Message from Jessica Wolfe MD sent at 5/1/2023  6:22 PM EDT -----   Urine culture is back. The Antibiotic she was prescribed should treat her UTI.    Jessica Wolfe MD

## 2023-05-06 LAB
METANEPH FREE SERPL-MCNC: 39.7 PG/ML (ref 0–88)
NORMETANEPHRINE SERPL-MCNC: 313.2 PG/ML (ref 0–244)
RENIN PLAS-CCNC: 0.28 NG/ML/HR (ref 0.17–5.38)

## 2023-05-08 DIAGNOSIS — I10 RESISTANT HYPERTENSION: ICD-10-CM

## 2023-05-12 LAB
COLLECT DURATION TIME UR: 24 HR
METANEPH 24H UR-MRATE: 122 UG/24 HR (ref 36–209)
METANEPHS 24H UR-MCNC: 135 UG/L
NORMETANEPHRINE 24H UR-MCNC: 604 UG/L
NORMETANEPHRINE 24H UR-MRATE: 544 UG/24 HR (ref 131–612)
SPECIMEN VOL ?TM UR: 900 ML

## 2023-05-15 DIAGNOSIS — I10 RESISTANT HYPERTENSION: ICD-10-CM

## 2023-05-19 LAB
COLLECT DURATION TIME UR: 24 HR
DOPAMINE 24H UR-MRATE: 196 UG/24 HR (ref 0–510)
DOPAMINE UR-MCNC: 218 UG/L
EPINEPH 24H UR-MRATE: 0 UG/24 HR (ref 0–20)
EPINEPH UR-MCNC: <1 UG/L
NOREPINEPH 24H UR-MRATE: 32 UG/24 HR (ref 0–135)
NOREPINEPH UR-MCNC: 36 UG/L
SPECIMEN VOL ?TM UR: 900 ML

## 2023-05-30 ENCOUNTER — OFFICE VISIT (OUTPATIENT)
Dept: INTERNAL MEDICINE CLINIC | Facility: CLINIC | Age: 53
End: 2023-05-30
Payer: COMMERCIAL

## 2023-05-30 VITALS
DIASTOLIC BLOOD PRESSURE: 107 MMHG | SYSTOLIC BLOOD PRESSURE: 148 MMHG | OXYGEN SATURATION: 97 % | RESPIRATION RATE: 18 BRPM | HEART RATE: 78 BPM | WEIGHT: 209 LBS | TEMPERATURE: 97.2 F | HEIGHT: 67 IN | BODY MASS INDEX: 32.8 KG/M2

## 2023-05-30 DIAGNOSIS — G44.52 NEW DAILY PERSISTENT HEADACHE: ICD-10-CM

## 2023-05-30 DIAGNOSIS — I10 RESISTANT HYPERTENSION: Primary | ICD-10-CM

## 2023-05-30 DIAGNOSIS — N95.1 HOT FLASHES DUE TO MENOPAUSE: ICD-10-CM

## 2023-05-30 DIAGNOSIS — I10 PRIMARY HYPERTENSION: ICD-10-CM

## 2023-05-30 PROCEDURE — 99214 OFFICE O/P EST MOD 30 MIN: CPT | Performed by: INTERNAL MEDICINE

## 2023-05-30 PROCEDURE — 3080F DIAST BP >= 90 MM HG: CPT | Performed by: INTERNAL MEDICINE

## 2023-05-30 PROCEDURE — 3077F SYST BP >= 140 MM HG: CPT | Performed by: INTERNAL MEDICINE

## 2023-05-30 RX ORDER — VALSARTAN AND HYDROCHLOROTHIAZIDE 160; 12.5 MG/1; MG/1
1 TABLET, FILM COATED ORAL DAILY
Qty: 90 TABLET | Refills: 1 | Status: SHIPPED | OUTPATIENT
Start: 2023-05-30

## 2023-05-30 ASSESSMENT — PATIENT HEALTH QUESTIONNAIRE - PHQ9
SUM OF ALL RESPONSES TO PHQ QUESTIONS 1-9: 0
SUM OF ALL RESPONSES TO PHQ QUESTIONS 1-9: 0
SUM OF ALL RESPONSES TO PHQ9 QUESTIONS 1 & 2: 0
SUM OF ALL RESPONSES TO PHQ QUESTIONS 1-9: 0
2. FEELING DOWN, DEPRESSED OR HOPELESS: 0
1. LITTLE INTEREST OR PLEASURE IN DOING THINGS: 0
SUM OF ALL RESPONSES TO PHQ QUESTIONS 1-9: 0

## 2023-05-30 NOTE — PROGRESS NOTES
05/30/2023   Location:[facility] [address] INTERNAL MEDICINE  [place]  Patient #:  [id number]  YOB: 1970        History of Present Illness     Chief Complaint   Patient presents with    Follow-up Chronic Condition     1 month f/u       Ms. Nikole Alva is a 47 y.o. female  who presents for follow up. Still with HA s and hot flashes. Has been on Paxil in a attempt to treat hot flashes. Sister is on prempro with some benefit. She wants to try. We have discussed in the past but decided against it due to family history. Her Grandmother had breast cancer and she has a cousin (Gms sisters dtr) with breast cancer. We discussed the need to monthly self breast exams and keep up with regular mammograms. She has history of fibrocystic breast.   She has had renal artery ultrasound that was negative for LISA. It did reveal her right kidney was atrophied. She has a history of CKD. She has catecholamine study with elevated free normetanephrine. Rest were WNL.   Last Labs  CBC:   Lab Results   Component Value Date/Time    WBC 5.8 01/13/2023 09:58 AM    RBC 4.05 01/13/2023 09:58 AM    HGB 11.5 01/13/2023 09:58 AM    HCT 36.6 01/13/2023 09:58 AM    MCV 90.4 01/13/2023 09:58 AM    MCH 28.4 01/13/2023 09:58 AM    MCHC 31.4 01/13/2023 09:58 AM    RDW 14.4 01/13/2023 09:58 AM     01/13/2023 09:58 AM    MPV 10.7 01/13/2023 09:58 AM     CMP:    Lab Results   Component Value Date/Time     01/13/2023 09:58 AM    K 4.1 01/13/2023 09:58 AM     01/13/2023 09:58 AM    CO2 28 01/13/2023 09:58 AM    BUN 15 01/13/2023 09:58 AM    CREATININE 1.20 01/13/2023 09:58 AM    LABGLOM 54 01/13/2023 09:58 AM    GLUCOSE 102 01/13/2023 09:58 AM    PROT 7.4 01/13/2023 09:58 AM    LABALBU 4.0 01/13/2023 09:58 AM    CALCIUM 10.6 01/13/2023 09:58 AM    BILITOT 0.3 01/13/2023 09:58 AM    ALKPHOS 69 01/13/2023 09:58 AM    AST 15 01/13/2023 09:58 AM    ALT 20 01/13/2023 09:58 AM       No Known Allergies  Past

## 2023-06-21 DIAGNOSIS — R23.2 FLUSHING: ICD-10-CM

## 2023-06-21 DIAGNOSIS — G44.52 NEW DAILY PERSISTENT HEADACHE: ICD-10-CM

## 2023-06-21 DIAGNOSIS — I10 RESISTANT HYPERTENSION: Primary | ICD-10-CM

## 2023-07-14 NOTE — TELEPHONE ENCOUNTER
Pharmacist early that I spoke to put this medication on hold and over looked it spoke with another pharmacist and she fixed his mistake and will notify the pt.

## 2023-07-14 NOTE — TELEPHONE ENCOUNTER
----- Message from Itzel Priyank sent at 7/14/2023 12:16 PM EDT -----  Subject: Refill Request    QUESTIONS  Name of Medication? estrogens, conjugated, (PREMARIN) 0.3 MG tablet  Patient-reported dosage and instructions? take 1 tab by mouth daily  How many days do you have left? 0  Preferred Pharmacy? [facility]/PH[facility]ACY #[id number]  Pharmacy phone number (if available)? [phone]  Additional Information for Provider? Pt wanted a rfill was only given a 2   wks supply and was trying to wait until her appt today to get a refill but   had to canceled appt. on 07/14/23 due to work. R/S for 07/25/23. But Pt   would like another refill until her appt. on 07/25/23  ---------------------------------------------------------------------------  --------------  CALL BACK INFO  What is the best way for the office to contact you? OK to leave message on   voicemail,Do not leave any message, patient will call back for answer,OK   to respond with electronic message via [employer] portal (only for patients   who have registered [employer] account)  Preferred Call Back Phone Number? [phone]  ---------------------------------------------------------------------------  --------------  SCRIPT ANSWERS  Relationship to Patient?  Self

## 2023-07-25 ENCOUNTER — OFFICE VISIT (OUTPATIENT)
Dept: INTERNAL MEDICINE CLINIC | Facility: CLINIC | Age: 53
End: 2023-07-25
Payer: COMMERCIAL

## 2023-07-25 VITALS
RESPIRATION RATE: 18 BRPM | BODY MASS INDEX: 33.59 KG/M2 | HEART RATE: 73 BPM | WEIGHT: 214 LBS | DIASTOLIC BLOOD PRESSURE: 104 MMHG | OXYGEN SATURATION: 97 % | TEMPERATURE: 97.5 F | HEIGHT: 67 IN | SYSTOLIC BLOOD PRESSURE: 153 MMHG

## 2023-07-25 DIAGNOSIS — R51.9 DAILY HEADACHE: ICD-10-CM

## 2023-07-25 DIAGNOSIS — I10 RESISTANT HYPERTENSION: Primary | ICD-10-CM

## 2023-07-25 LAB
ANION GAP SERPL CALC-SCNC: 7 MMOL/L (ref 2–11)
BUN SERPL-MCNC: 18 MG/DL (ref 6–23)
CALCIUM SERPL-MCNC: 10.4 MG/DL (ref 8.3–10.4)
CHLORIDE SERPL-SCNC: 107 MMOL/L (ref 101–110)
CO2 SERPL-SCNC: 28 MMOL/L (ref 21–32)
CREAT SERPL-MCNC: 1.4 MG/DL (ref 0.6–1)
GLUCOSE SERPL-MCNC: 95 MG/DL (ref 65–100)
POTASSIUM SERPL-SCNC: 3.8 MMOL/L (ref 3.5–5.1)
SODIUM SERPL-SCNC: 142 MMOL/L (ref 133–143)

## 2023-07-25 PROCEDURE — 99213 OFFICE O/P EST LOW 20 MIN: CPT | Performed by: INTERNAL MEDICINE

## 2023-07-25 PROCEDURE — 3078F DIAST BP <80 MM HG: CPT | Performed by: INTERNAL MEDICINE

## 2023-07-25 PROCEDURE — 3074F SYST BP LT 130 MM HG: CPT | Performed by: INTERNAL MEDICINE

## 2023-07-25 RX ORDER — TRIAMCINOLONE ACETONIDE 1 MG/G
CREAM TOPICAL 2 TIMES DAILY
Qty: 30 G | Refills: 1 | Status: SHIPPED | OUTPATIENT
Start: 2023-07-25

## 2023-07-25 ASSESSMENT — PATIENT HEALTH QUESTIONNAIRE - PHQ9
SUM OF ALL RESPONSES TO PHQ9 QUESTIONS 1 & 2: 0
2. FEELING DOWN, DEPRESSED OR HOPELESS: 0
SUM OF ALL RESPONSES TO PHQ QUESTIONS 1-9: 0
1. LITTLE INTEREST OR PLEASURE IN DOING THINGS: 0
SUM OF ALL RESPONSES TO PHQ QUESTIONS 1-9: 0

## 2023-07-25 NOTE — PROGRESS NOTES
07/25/2023   Location:[address] INTERNAL MEDICINE  [place]  Patient #:  [id number]  YOB: 1970        History of Present Illness     Chief Complaint   Patient presents with    Follow-up Chronic Condition     6 week f/u    Referral - General     Pt has question why she was referred to endo       Ms. Natalya Zapata is a 47 y.o. female  who presents for Follow up on chronic medical issues. There is compliance and tolerance with medications. Thought she was on too much medication so stopped taking metoprolol twice a day. Home SBP readings in the 150s.     Last Labs  CBC:   Lab Results   Component Value Date/Time    WBC 5.8 01/13/2023 09:58 AM    RBC 4.05 01/13/2023 09:58 AM    HGB 11.5 01/13/2023 09:58 AM    HCT 36.6 01/13/2023 09:58 AM    MCV 90.4 01/13/2023 09:58 AM    MCH 28.4 01/13/2023 09:58 AM    MCHC 31.4 01/13/2023 09:58 AM    RDW 14.4 01/13/2023 09:58 AM     01/13/2023 09:58 AM    MPV 10.7 01/13/2023 09:58 AM     BMP:    Lab Results   Component Value Date/Time     07/25/2023 09:54 AM    K 3.8 07/25/2023 09:54 AM     07/25/2023 09:54 AM    CO2 28 07/25/2023 09:54 AM    BUN 18 07/25/2023 09:54 AM    LABALBU 4.0 01/13/2023 09:58 AM    CREATININE 1.40 07/25/2023 09:54 AM    CALCIUM 10.4 07/25/2023 09:54 AM    LABGLOM 45 07/25/2023 09:54 AM    GLUCOSE 95 07/25/2023 09:54 AM       No Known Allergies  Past Medical History:   Diagnosis Date    Hypertension      Social History     Socioeconomic History    Marital status:      Spouse name: None    Number of children: None    Years of education: None    Highest education level: None   Tobacco Use    Smoking status: Never    Smokeless tobacco: Never   Vaping Use    Vaping Use: Never used   Substance and Sexual Activity    Drug use: Never     Social Determinants of Health     Financial Resource Strain: Medium Risk    Difficulty of Paying Living Expenses: Somewhat hard   Food Insecurity: Unknown    Worried

## 2023-08-04 ENCOUNTER — TELEPHONE (OUTPATIENT)
Dept: INTERNAL MEDICINE CLINIC | Facility: CLINIC | Age: 53
End: 2023-08-04

## 2023-08-04 NOTE — TELEPHONE ENCOUNTER
----- Message from Amanda Osorio MD sent at 8/4/2023  8:00 AM EDT -----  Kidney fxn is declining a little. What has her BP been running recently?   Amanda Osorio MD

## 2023-11-27 ENCOUNTER — OFFICE VISIT (OUTPATIENT)
Dept: INTERNAL MEDICINE CLINIC | Facility: CLINIC | Age: 53
End: 2023-11-27

## 2023-11-27 VITALS
SYSTOLIC BLOOD PRESSURE: 154 MMHG | DIASTOLIC BLOOD PRESSURE: 98 MMHG | HEART RATE: 70 BPM | BODY MASS INDEX: 35.31 KG/M2 | HEIGHT: 67 IN | OXYGEN SATURATION: 99 % | TEMPERATURE: 97.7 F | WEIGHT: 225 LBS

## 2023-11-27 DIAGNOSIS — I10 PRIMARY HYPERTENSION: Primary | ICD-10-CM

## 2023-11-27 DIAGNOSIS — N95.1 HOT FLASHES DUE TO MENOPAUSE: ICD-10-CM

## 2023-11-27 PROCEDURE — 3074F SYST BP LT 130 MM HG: CPT | Performed by: INTERNAL MEDICINE

## 2023-11-27 PROCEDURE — 3078F DIAST BP <80 MM HG: CPT | Performed by: INTERNAL MEDICINE

## 2023-11-27 PROCEDURE — 99213 OFFICE O/P EST LOW 20 MIN: CPT | Performed by: INTERNAL MEDICINE

## 2023-11-27 RX ORDER — PAROXETINE HYDROCHLORIDE 20 MG/1
20 TABLET, FILM COATED ORAL EVERY EVENING
Qty: 90 TABLET | Refills: 3 | Status: SHIPPED | OUTPATIENT
Start: 2023-11-27 | End: 2024-11-26

## 2023-11-27 RX ORDER — ESTRADIOL 1 MG/1
0.25 TABLET ORAL DAILY
Qty: 90 TABLET | Refills: 1 | Status: SHIPPED | OUTPATIENT
Start: 2023-11-27

## 2023-11-27 NOTE — PROGRESS NOTES
found for: \"TSH\"    No Known Allergies  Past Medical History:   Diagnosis Date    Hypertension      Social History     Socioeconomic History    Marital status:      Spouse name: None    Number of children: None    Years of education: None    Highest education level: None   Tobacco Use    Smoking status: Never    Smokeless tobacco: Never   Vaping Use    Vaping Use: Never used   Substance and Sexual Activity    Drug use: Never     Social Determinants of Health     Financial Resource Strain: Medium Risk (2/27/2023)    Overall Financial Resource Strain (CARDIA)     Difficulty of Paying Living Expenses: Somewhat hard   Food Insecurity: Unknown (2/27/2023)    Hunger Vital Sign     Worried About Running Out of Food in the Last Year: Patient refused     [address] in the Last Year: Patient refused   Transportation Needs: Unknown (2/27/2023)    PRAPARE - Transportation     Lack of Transportation (Non-Medical): No   Housing Stability: Unknown (2/27/2023)    Housing Stability Vital Sign     Unstable Housing in the Last Year: No     Past Surgical History:   Procedure Laterality Date    BREAST BIOPSY Right     HYSTERECTOMY, TOTAL ABDOMINAL (CERVIX REMOVED)  2000    DUB    MOUTH SURGERY      WISDOM TOOTH EXTRACTION       Family History   Problem Relation Age of Onset    Heart Attack Father     Breast Cancer Maternal Grandmother      Current Outpatient Medications   Medication Sig Dispense Refill    triamcinolone (KENALOG) 0.1 % cream Apply topically 2 times daily Apply topically 2 times daily.  30 g 1    valsartan-hydroCHLOROthiazide (DIOVAN-HCT) 160-12.5 MG per tablet Take 1 tablet by mouth daily 90 tablet 1    metoprolol tartrate 75 MG TABS Take 75 mg by mouth 2 times daily (Patient taking differently: Take 75 mg by mouth 2 times daily Pt report she is only taking it once a day) 180 tablet 3    PARoxetine (PAXIL) 20 MG tablet Take 1 tablet by mouth every evening 90 tablet 3    estrogens, conjugated, (PREMARIN) 0.3 MG

## 2024-02-07 RX ORDER — VALSARTAN AND HYDROCHLOROTHIAZIDE 160; 12.5 MG/1; MG/1
1 TABLET, FILM COATED ORAL DAILY
Qty: 90 TABLET | Refills: 3 | Status: SHIPPED | OUTPATIENT
Start: 2024-02-07

## 2024-03-25 ENCOUNTER — OFFICE VISIT (OUTPATIENT)
Dept: INTERNAL MEDICINE CLINIC | Facility: CLINIC | Age: 54
End: 2024-03-25

## 2024-03-25 VITALS
HEART RATE: 66 BPM | BODY MASS INDEX: 33.59 KG/M2 | WEIGHT: 214 LBS | TEMPERATURE: 97.6 F | OXYGEN SATURATION: 100 % | SYSTOLIC BLOOD PRESSURE: 136 MMHG | DIASTOLIC BLOOD PRESSURE: 96 MMHG | HEIGHT: 67 IN

## 2024-03-25 DIAGNOSIS — G25.81 RLS (RESTLESS LEGS SYNDROME): ICD-10-CM

## 2024-03-25 DIAGNOSIS — L30.9 ECZEMA, UNSPECIFIED TYPE: ICD-10-CM

## 2024-03-25 DIAGNOSIS — I10 PRIMARY HYPERTENSION: Primary | ICD-10-CM

## 2024-03-25 PROCEDURE — 3075F SYST BP GE 130 - 139MM HG: CPT | Performed by: INTERNAL MEDICINE

## 2024-03-25 PROCEDURE — 99213 OFFICE O/P EST LOW 20 MIN: CPT | Performed by: INTERNAL MEDICINE

## 2024-03-25 PROCEDURE — 3080F DIAST BP >= 90 MM HG: CPT | Performed by: INTERNAL MEDICINE

## 2024-03-25 RX ORDER — TRIAMCINOLONE ACETONIDE 1 MG/G
OINTMENT TOPICAL
Qty: 454 G | Refills: 1 | Status: SHIPPED | OUTPATIENT
Start: 2024-03-25 | End: 2024-04-01

## 2024-03-25 SDOH — ECONOMIC STABILITY: FOOD INSECURITY: WITHIN THE PAST 12 MONTHS, YOU WORRIED THAT YOUR FOOD WOULD RUN OUT BEFORE YOU GOT MONEY TO BUY MORE.: PATIENT DECLINED

## 2024-03-25 SDOH — ECONOMIC STABILITY: INCOME INSECURITY: HOW HARD IS IT FOR YOU TO PAY FOR THE VERY BASICS LIKE FOOD, HOUSING, MEDICAL CARE, AND HEATING?: PATIENT DECLINED

## 2024-03-25 SDOH — ECONOMIC STABILITY: FOOD INSECURITY: WITHIN THE PAST 12 MONTHS, THE FOOD YOU BOUGHT JUST DIDN'T LAST AND YOU DIDN'T HAVE MONEY TO GET MORE.: PATIENT DECLINED

## 2024-03-25 NOTE — PROGRESS NOTES
03/25/2024   Location:[facility] PHYSICIAN SERVICES  [facility] [place] [place]INE  [place]  Patient #:  [id number]  YOB: 1970        History of Present Illness     Chief Complaint   Patient presents with    3 Month Follow-Up     3 month follow-up    Congestion     Allergies causing congestion. Pt taking claritin OTC    Rash     Pt reports heat rash on both arms. Requesting cream       Ms. Krishnamurthy is a 53 y.o. female  who presents for Follow up on chronic medical issues. There is compliance and tolerance with medications.    Chronic active medical issues assessed today include  HTN, hotflashes CKD.  She lost her job and her insurance and did not keep appt with specialist.   She is not taking ERT due to cost of premarin. Her hot flashes are increased.  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 She is able to return for some labs.     Reports issues sleeping. Legs uncomfortable and she has to keep moving or stretching legs.  May have RLS      Last Labs  CBC:   Lab Results   Component Value Date/Time    WBC 5.8 01/13/2023 09:58 AM    RBC 4.05 01/13/2023 09:58 AM    HGB 11.5 01/13/2023 09:58 AM    HCT 36.6 01/13/2023 09:58 AM    MCV 90.4 01/13/2023 09:58 AM    MCH 28.4 01/13/2023 09:58 AM    MCHC 31.4 01/13/2023 09:58 AM    RDW 14.4 01/13/2023 09:58 AM     01/13/2023 09:58 AM    MPV 10.7 01/13/2023 09:58 AM     CMP:    Lab Results   Component Value Date/Time     07/25/2023 09:54 AM    K 3.8 07/25/2023 09:54 AM     07/25/2023 09:54 AM    CO2 28 07/25/2023 09:54 AM    BUN 18 07/25/2023 09:54 AM    CREATININE 1.40 07/25/2023 09:54 AM    AGRATIO 1.2 01/13/2023 09:58 AM    LABGLOM 45 07/25/2023 09:54 AM    GLUCOSE 95 07/25/2023 09:54 AM    PROT 7.4 01/13/2023 09:58 AM    LABALBU 4.0 01/13/2023 09:58 AM    CALCIUM 10.4 07/25/2023 09:54 AM    BILITOT 0.3 01/13/2023 09:58 AM    ALKPHOS 69 01/13/2023 09:58 AM    AST 15 01/13/2023 09:58 AM    ALT 20

## 2024-04-01 ENCOUNTER — NURSE ONLY (OUTPATIENT)
Dept: INTERNAL MEDICINE CLINIC | Facility: CLINIC | Age: 54
End: 2024-04-01

## 2024-04-01 DIAGNOSIS — I10 PRIMARY HYPERTENSION: ICD-10-CM

## 2024-04-01 DIAGNOSIS — G25.81 RLS (RESTLESS LEGS SYNDROME): ICD-10-CM

## 2024-04-01 LAB
ANION GAP SERPL CALC-SCNC: 4 MMOL/L (ref 2–11)
BUN SERPL-MCNC: 18 MG/DL (ref 6–23)
CALCIUM SERPL-MCNC: 9.6 MG/DL (ref 8.3–10.4)
CHLORIDE SERPL-SCNC: 105 MMOL/L (ref 103–113)
CO2 SERPL-SCNC: 30 MMOL/L (ref 21–32)
CREAT SERPL-MCNC: 1.4 MG/DL (ref 0.6–1)
GLUCOSE SERPL-MCNC: 140 MG/DL (ref 65–100)
IRON SERPL-MCNC: 53 UG/DL (ref 35–150)
POTASSIUM SERPL-SCNC: 3.6 MMOL/L (ref 3.5–5.1)
SODIUM SERPL-SCNC: 139 MMOL/L (ref 136–146)

## 2024-04-03 ENCOUNTER — TELEPHONE (OUTPATIENT)
Dept: INTERNAL MEDICINE CLINIC | Facility: CLINIC | Age: 54
End: 2024-04-03

## 2024-04-03 NOTE — TELEPHONE ENCOUNTER
----- Message from Zeinab Chambers MD sent at 4/2/2024  4:32 PM EDT -----  Stable chronic kidney disease   Iron level in normal range.

## 2024-06-25 ENCOUNTER — OFFICE VISIT (OUTPATIENT)
Dept: INTERNAL MEDICINE CLINIC | Facility: CLINIC | Age: 54
End: 2024-06-25

## 2024-06-25 VITALS
BODY MASS INDEX: 33.27 KG/M2 | OXYGEN SATURATION: 100 % | HEART RATE: 73 BPM | SYSTOLIC BLOOD PRESSURE: 146 MMHG | TEMPERATURE: 97.3 F | WEIGHT: 212 LBS | HEIGHT: 67 IN | DIASTOLIC BLOOD PRESSURE: 99 MMHG

## 2024-06-25 DIAGNOSIS — I10 PRIMARY HYPERTENSION: Primary | ICD-10-CM

## 2024-06-25 DIAGNOSIS — N18.31 STAGE 3A CHRONIC KIDNEY DISEASE (HCC): ICD-10-CM

## 2024-06-25 PROCEDURE — 99214 OFFICE O/P EST MOD 30 MIN: CPT | Performed by: INTERNAL MEDICINE

## 2024-06-25 PROCEDURE — 3077F SYST BP >= 140 MM HG: CPT | Performed by: INTERNAL MEDICINE

## 2024-06-25 PROCEDURE — 3080F DIAST BP >= 90 MM HG: CPT | Performed by: INTERNAL MEDICINE

## 2024-06-25 RX ORDER — HYDRALAZINE HYDROCHLORIDE 10 MG/1
10 TABLET, FILM COATED ORAL 3 TIMES DAILY
Qty: 90 TABLET | Refills: 3 | Status: SHIPPED | OUTPATIENT
Start: 2024-06-25 | End: 2025-06-25

## 2024-06-25 RX ORDER — METOPROLOL TARTRATE 75 MG/1
75 TABLET, FILM COATED ORAL DAILY
Qty: 90 TABLET | Refills: 3 | Status: CANCELLED | OUTPATIENT
Start: 2024-06-25 | End: 2025-06-25

## 2024-06-25 RX ORDER — METOPROLOL SUCCINATE 100 MG/1
100 TABLET, EXTENDED RELEASE ORAL DAILY
Qty: 90 TABLET | Refills: 1 | Status: SHIPPED | OUTPATIENT
Start: 2024-06-25

## 2024-06-25 RX ORDER — HYDROCHLOROTHIAZIDE 12.5 MG/1
12.5 CAPSULE, GELATIN COATED ORAL EVERY MORNING
Qty: 90 CAPSULE | Refills: 1 | Status: SHIPPED | OUTPATIENT
Start: 2024-06-25

## 2024-06-25 ASSESSMENT — PATIENT HEALTH QUESTIONNAIRE - PHQ9
SUM OF ALL RESPONSES TO PHQ9 QUESTIONS 1 & 2: 0
SUM OF ALL RESPONSES TO PHQ QUESTIONS 1-9: 0
SUM OF ALL RESPONSES TO PHQ QUESTIONS 1-9: 0
1. LITTLE INTEREST OR PLEASURE IN DOING THINGS: NOT AT ALL
SUM OF ALL RESPONSES TO PHQ QUESTIONS 1-9: 0
SUM OF ALL RESPONSES TO PHQ QUESTIONS 1-9: 0
2. FEELING DOWN, DEPRESSED OR HOPELESS: NOT AT ALL

## 2024-06-25 NOTE — PROGRESS NOTES
06/25/2024   Location:[facility] PHYSICIAN SERVICES  [facility] [place]  [place]  Patient #:  [id number]  YOB: 1970        History of Present Illness     Chief Complaint   Patient presents with    3 Month Follow-Up     3 month follow-up     Hypertension       Ms. Krishnamurthy is a 53 y.o. female  who presents for Follow up on chronic medical issues. There is compliance and tolerance with medications.    Chronic active medical issues assessed today include  HTN, hotflashes CKD.  Has only been taking Metoprolol tartrate once a day.   She feels the diovan is causing side effects HA  Has not been checking BS regularly.   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 She is doing well.    Last Labs  CBC:   Lab Results   Component Value Date/Time    WBC 5.8 01/13/2023 09:58 AM    RBC 4.05 01/13/2023 09:58 AM    HGB 11.5 01/13/2023 09:58 AM    HCT 36.6 01/13/2023 09:58 AM    MCV 90.4 01/13/2023 09:58 AM    MCH 28.4 01/13/2023 09:58 AM    MCHC 31.4 01/13/2023 09:58 AM    RDW 14.4 01/13/2023 09:58 AM     01/13/2023 09:58 AM    MPV 10.7 01/13/2023 09:58 AM     CMP:    Lab Results   Component Value Date/Time     04/01/2024 02:41 PM    K 3.6 04/01/2024 02:41 PM     04/01/2024 02:41 PM    CO2 30 04/01/2024 02:41 PM    BUN 18 04/01/2024 02:41 PM    CREATININE 1.40 04/01/2024 02:41 PM    LABGLOM 45 04/01/2024 02:41 PM    GLUCOSE 140 04/01/2024 02:41 PM    CALCIUM 9.6 04/01/2024 02:41 PM    BILITOT 0.3 01/13/2023 09:58 AM    ALKPHOS 69 01/13/2023 09:58 AM    AST 15 01/13/2023 09:58 AM    ALT 20 01/13/2023 09:58 AM     FLP:  No results found for: \"TRIG\", \"HDL\", \"LDLDIRECT\"  TSH:  No results found for: \"TSH\"    No Known Allergies  Past Medical History:   Diagnosis Date    Hypertension      Social History     Socioeconomic History    Marital status:      Spouse name: None    Number of children: None    Years of education: None    Highest education level: None   Tobacco Use

## 2024-08-09 NOTE — PROGRESS NOTES
Labs for Dofetilide monitoring:  ----- Message from TISHA Ramos sent at 8/8/2024  6:45 PM CDT -----  Potassium, and magnesium are within normal range.  Creatinine is slightly elevated at 1.25, okay to continue dofetilide      Spoke with Isa. Relayed above lab results. Continue on current dosing of Dofetilide.    3/27/2024     Increase Mamta  Start work up for secondary causes of HTN. She wants to continue paxil\        Return in about 4 weeks (around 4/24/2023) for re evaluation of medication change, re evaluation of hypertension.         Rachel Alfonso MD

## 2024-09-23 RX ORDER — HYDRALAZINE HYDROCHLORIDE 10 MG/1
10 TABLET, FILM COATED ORAL 3 TIMES DAILY
Qty: 270 TABLET | Refills: 1 | Status: SHIPPED | OUTPATIENT
Start: 2024-09-23

## 2024-12-26 RX ORDER — ESTRADIOL 1 MG/1
TABLET ORAL
Qty: 90 TABLET | Refills: 1 | OUTPATIENT
Start: 2024-12-26

## 2024-12-26 RX ORDER — ESTRADIOL 1 MG/1
0.25 TABLET ORAL DAILY
Qty: 90 TABLET | Refills: 0 | Status: SHIPPED | OUTPATIENT
Start: 2024-12-26

## 2025-01-06 ENCOUNTER — OFFICE VISIT (OUTPATIENT)
Dept: INTERNAL MEDICINE CLINIC | Facility: CLINIC | Age: 55
End: 2025-01-06

## 2025-01-06 VITALS
BODY MASS INDEX: 32.83 KG/M2 | TEMPERATURE: 97.7 F | HEIGHT: 67 IN | SYSTOLIC BLOOD PRESSURE: 153 MMHG | DIASTOLIC BLOOD PRESSURE: 93 MMHG | HEART RATE: 62 BPM | WEIGHT: 209.2 LBS | RESPIRATION RATE: 16 BRPM | OXYGEN SATURATION: 100 %

## 2025-01-06 DIAGNOSIS — N39.41 URGENCY INCONTINENCE: ICD-10-CM

## 2025-01-06 DIAGNOSIS — I10 PRIMARY HYPERTENSION: ICD-10-CM

## 2025-01-06 DIAGNOSIS — N95.1 HOT FLASHES DUE TO MENOPAUSE: ICD-10-CM

## 2025-01-06 DIAGNOSIS — I10 PRIMARY HYPERTENSION: Primary | ICD-10-CM

## 2025-01-06 DIAGNOSIS — N18.31 STAGE 3A CHRONIC KIDNEY DISEASE (HCC): ICD-10-CM

## 2025-01-06 LAB
ALBUMIN SERPL-MCNC: 3.9 G/DL (ref 3.5–5)
ALBUMIN/GLOB SERPL: 1 (ref 1–1.9)
ALP SERPL-CCNC: 76 U/L (ref 35–104)
ALT SERPL-CCNC: 16 U/L (ref 8–45)
ANION GAP SERPL CALC-SCNC: 9 MMOL/L (ref 7–16)
APPEARANCE UR: CLEAR
AST SERPL-CCNC: 25 U/L (ref 15–37)
BASOPHILS # BLD: 0 K/UL (ref 0–0.2)
BASOPHILS NFR BLD: 1 % (ref 0–2)
BILIRUB SERPL-MCNC: 0.3 MG/DL (ref 0–1.2)
BILIRUB UR QL: NEGATIVE
BUN SERPL-MCNC: 17 MG/DL (ref 6–23)
CALCIUM SERPL-MCNC: 9.9 MG/DL (ref 8.8–10.2)
CHLORIDE SERPL-SCNC: 103 MMOL/L (ref 98–107)
CO2 SERPL-SCNC: 27 MMOL/L (ref 20–29)
COLOR UR: NORMAL
CREAT SERPL-MCNC: 1.32 MG/DL (ref 0.6–1.1)
DIFFERENTIAL METHOD BLD: NORMAL
EOSINOPHIL # BLD: 0.1 K/UL (ref 0–0.8)
EOSINOPHIL NFR BLD: 2 % (ref 0.5–7.8)
ERYTHROCYTE [DISTWIDTH] IN BLOOD BY AUTOMATED COUNT: 14 % (ref 11.9–14.6)
GLOBULIN SER CALC-MCNC: 4 G/DL (ref 2.3–3.5)
GLUCOSE SERPL-MCNC: 80 MG/DL (ref 70–99)
GLUCOSE UR STRIP.AUTO-MCNC: NEGATIVE MG/DL
HCT VFR BLD AUTO: 37.8 % (ref 35.8–46.3)
HGB BLD-MCNC: 11.9 G/DL (ref 11.7–15.4)
HGB UR QL STRIP: NEGATIVE
IMM GRANULOCYTES # BLD AUTO: 0 K/UL (ref 0–0.5)
IMM GRANULOCYTES NFR BLD AUTO: 0 % (ref 0–5)
KETONES UR QL STRIP.AUTO: NEGATIVE MG/DL
LEUKOCYTE ESTERASE UR QL STRIP.AUTO: NEGATIVE
LYMPHOCYTES # BLD: 1.5 K/UL (ref 0.5–4.6)
LYMPHOCYTES NFR BLD: 24 % (ref 13–44)
MCH RBC QN AUTO: 27.7 PG (ref 26.1–32.9)
MCHC RBC AUTO-ENTMCNC: 31.5 G/DL (ref 31.4–35)
MCV RBC AUTO: 87.9 FL (ref 82–102)
MONOCYTES # BLD: 0.5 K/UL (ref 0.1–1.3)
MONOCYTES NFR BLD: 7 % (ref 4–12)
NEUTS SEG # BLD: 4.2 K/UL (ref 1.7–8.2)
NEUTS SEG NFR BLD: 66 % (ref 43–78)
NITRITE UR QL STRIP.AUTO: NEGATIVE
NRBC # BLD: 0 K/UL (ref 0–0.2)
PH UR STRIP: 6 (ref 5–9)
PLATELET # BLD AUTO: 304 K/UL (ref 150–450)
PMV BLD AUTO: 11.3 FL (ref 9.4–12.3)
POTASSIUM SERPL-SCNC: 3.4 MMOL/L (ref 3.5–5.1)
PROT SERPL-MCNC: 7.9 G/DL (ref 6.3–8.2)
PROT UR STRIP-MCNC: NEGATIVE MG/DL
RBC # BLD AUTO: 4.3 M/UL (ref 4.05–5.2)
SODIUM SERPL-SCNC: 139 MMOL/L (ref 136–145)
SP GR UR REFRACTOMETRY: 1 (ref 1–1.02)
TSH W FREE THYROID IF ABNORMAL: 0.73 UIU/ML (ref 0.27–4.2)
UROBILINOGEN UR QL STRIP.AUTO: 0.2 EU/DL (ref 0.2–1)
WBC # BLD AUTO: 6.3 K/UL (ref 4.3–11.1)

## 2025-01-06 PROCEDURE — 99214 OFFICE O/P EST MOD 30 MIN: CPT | Performed by: INTERNAL MEDICINE

## 2025-01-06 PROCEDURE — 3080F DIAST BP >= 90 MM HG: CPT | Performed by: INTERNAL MEDICINE

## 2025-01-06 PROCEDURE — 3077F SYST BP >= 140 MM HG: CPT | Performed by: INTERNAL MEDICINE

## 2025-01-06 RX ORDER — HYDROCHLOROTHIAZIDE 12.5 MG/1
12.5 CAPSULE ORAL EVERY MORNING
Qty: 90 CAPSULE | Refills: 1 | Status: SHIPPED | OUTPATIENT
Start: 2025-01-06

## 2025-01-06 RX ORDER — ESTRADIOL 1 MG/1
0.25 TABLET ORAL DAILY
Qty: 90 TABLET | Refills: 3 | Status: SHIPPED | OUTPATIENT
Start: 2025-01-06

## 2025-01-06 RX ORDER — METOPROLOL SUCCINATE 100 MG/1
100 TABLET, EXTENDED RELEASE ORAL DAILY
Qty: 90 TABLET | Refills: 3 | Status: SHIPPED | OUTPATIENT
Start: 2025-01-06

## 2025-01-06 RX ORDER — METOPROLOL SUCCINATE 100 MG/1
100 TABLET, EXTENDED RELEASE ORAL DAILY
Qty: 90 TABLET | Refills: 1 | Status: SHIPPED | OUTPATIENT
Start: 2025-01-06 | End: 2025-01-06

## 2025-01-06 RX ORDER — HYDROCHLOROTHIAZIDE 12.5 MG/1
12.5 CAPSULE ORAL EVERY MORNING
Qty: 90 CAPSULE | Refills: 1 | Status: SHIPPED | OUTPATIENT
Start: 2025-01-06 | End: 2025-01-06

## 2025-01-06 ASSESSMENT — PATIENT HEALTH QUESTIONNAIRE - PHQ9
2. FEELING DOWN, DEPRESSED OR HOPELESS: SEVERAL DAYS
SUM OF ALL RESPONSES TO PHQ9 QUESTIONS 1 & 2: 1
1. LITTLE INTEREST OR PLEASURE IN DOING THINGS: NOT AT ALL
SUM OF ALL RESPONSES TO PHQ QUESTIONS 1-9: 1

## 2025-01-06 NOTE — PROGRESS NOTES
01/06/2025   Location:[facility] PHYSICIAN SERVICES  [facility] [place]INE  [place]  Patient #:  [id number]  YOB: 1970        History of Present Illness     Chief Complaint   Patient presents with    Hypertension       Ms. Krishnamurthy is a 54 y.o. female  who presents for Follow up on chronic medical issues. There is compliance and tolerance with medications.    Chronic active medical issues HTN, CKD, symptomatic menopause.  She is not taking one of her BP meds. She is not sure which one and not clear why. She has been prescirbed Hydralazine, metoprolol and HCTz. She thinks it the hydralazine that she is not taking.   She ran out of ERT and tried off of it.  Four weeks out she started having hot flashes.    Last Labs  CBC:   Lab Results   Component Value Date/Time    WBC 6.3 01/06/2025 04:31 PM    RBC 4.30 01/06/2025 04:31 PM    HGB 11.9 01/06/2025 04:31 PM    HCT 37.8 01/06/2025 04:31 PM    MCV 87.9 01/06/2025 04:31 PM    MCH 27.7 01/06/2025 04:31 PM    MCHC 31.5 01/06/2025 04:31 PM    RDW 14.0 01/06/2025 04:31 PM     01/06/2025 04:31 PM    MPV 11.3 01/06/2025 04:31 PM     CMP:    Lab Results   Component Value Date/Time     01/06/2025 04:31 PM    K 3.4 01/06/2025 04:31 PM     01/06/2025 04:31 PM    CO2 27 01/06/2025 04:31 PM    BUN 17 01/06/2025 04:31 PM    CREATININE 1.32 01/06/2025 04:31 PM    LABGLOM 48 01/06/2025 04:31 PM    LABGLOM 45 04/01/2024 02:41 PM    GLUCOSE 80 01/06/2025 04:31 PM    CALCIUM 9.9 01/06/2025 04:31 PM    BILITOT 0.3 01/06/2025 04:31 PM    ALKPHOS 76 01/06/2025 04:31 PM    AST 25 01/06/2025 04:31 PM    ALT 16 01/06/2025 04:31 PM       No Known Allergies  Past Medical History:   Diagnosis Date    Hypertension      Social History     Socioeconomic History    Marital status:      Spouse name: None    Number of children: None    Years of education: None    Highest education level: None   Tobacco Use    Smoking status: Never    Smokeless tobacco:

## 2025-01-07 RX ORDER — POTASSIUM CHLORIDE 750 MG/1
10 CAPSULE, EXTENDED RELEASE ORAL DAILY
Qty: 90 CAPSULE | Refills: 3 | Status: SHIPPED | OUTPATIENT
Start: 2025-01-07

## 2025-06-23 ENCOUNTER — OFFICE VISIT (OUTPATIENT)
Dept: INTERNAL MEDICINE CLINIC | Facility: CLINIC | Age: 55
End: 2025-06-23

## 2025-06-23 VITALS
DIASTOLIC BLOOD PRESSURE: 97 MMHG | HEIGHT: 67 IN | HEART RATE: 63 BPM | OXYGEN SATURATION: 99 % | SYSTOLIC BLOOD PRESSURE: 140 MMHG | WEIGHT: 210.6 LBS | TEMPERATURE: 97.4 F | RESPIRATION RATE: 16 BRPM | BODY MASS INDEX: 33.06 KG/M2

## 2025-06-23 DIAGNOSIS — N95.1 HOT FLASHES DUE TO MENOPAUSE: ICD-10-CM

## 2025-06-23 DIAGNOSIS — J01.90 ACUTE BACTERIAL SINUSITIS: Primary | ICD-10-CM

## 2025-06-23 DIAGNOSIS — M25.532 LEFT WRIST PAIN: ICD-10-CM

## 2025-06-23 DIAGNOSIS — F41.8 ANXIETY WITH DEPRESSION: ICD-10-CM

## 2025-06-23 DIAGNOSIS — Z12.31 SCREENING MAMMOGRAM FOR BREAST CANCER: ICD-10-CM

## 2025-06-23 DIAGNOSIS — I10 PRIMARY HYPERTENSION: ICD-10-CM

## 2025-06-23 DIAGNOSIS — B96.89 ACUTE BACTERIAL SINUSITIS: Primary | ICD-10-CM

## 2025-06-23 PROCEDURE — 3080F DIAST BP >= 90 MM HG: CPT | Performed by: INTERNAL MEDICINE

## 2025-06-23 PROCEDURE — 3077F SYST BP >= 140 MM HG: CPT | Performed by: INTERNAL MEDICINE

## 2025-06-23 PROCEDURE — 99213 OFFICE O/P EST LOW 20 MIN: CPT | Performed by: INTERNAL MEDICINE

## 2025-06-23 RX ORDER — HYDRALAZINE HYDROCHLORIDE 10 MG/1
10 TABLET, FILM COATED ORAL 3 TIMES DAILY
Qty: 270 TABLET | Refills: 3 | Status: SHIPPED | OUTPATIENT
Start: 2025-06-23

## 2025-06-23 RX ORDER — PAROXETINE 20 MG/1
20 TABLET, FILM COATED ORAL EVERY EVENING
Qty: 90 TABLET | Refills: 3 | Status: SHIPPED | OUTPATIENT
Start: 2025-06-23 | End: 2026-06-23

## 2025-06-23 RX ORDER — ESTRADIOL 1 MG/1
1 TABLET ORAL DAILY
Qty: 90 TABLET | Refills: 3 | Status: SHIPPED | OUTPATIENT
Start: 2025-06-23

## 2025-06-23 RX ORDER — HYDROCHLOROTHIAZIDE 12.5 MG/1
12.5 CAPSULE ORAL EVERY MORNING
Qty: 90 CAPSULE | Refills: 3 | Status: SHIPPED | OUTPATIENT
Start: 2025-06-23

## 2025-06-23 NOTE — PROGRESS NOTES
technology. The patient (or guardian, if applicable) and other individuals in attendance at the appointment consented to the use of AI, including the recording.

## 2025-07-10 DIAGNOSIS — Z12.31 SCREENING MAMMOGRAM FOR BREAST CANCER: ICD-10-CM

## 2025-07-16 ENCOUNTER — TELEPHONE (OUTPATIENT)
Dept: INTERNAL MEDICINE CLINIC | Facility: CLINIC | Age: 55
End: 2025-07-16